# Patient Record
Sex: FEMALE | Race: WHITE | NOT HISPANIC OR LATINO | Employment: FULL TIME | URBAN - METROPOLITAN AREA
[De-identification: names, ages, dates, MRNs, and addresses within clinical notes are randomized per-mention and may not be internally consistent; named-entity substitution may affect disease eponyms.]

---

## 2024-02-12 DIAGNOSIS — R05.9 COUGH, UNSPECIFIED TYPE: Primary | ICD-10-CM

## 2024-02-15 ENCOUNTER — CLINICAL SUPPORT (OUTPATIENT)
Dept: PULMONOLOGY | Facility: CLINIC | Age: 29
End: 2024-02-15
Attending: NURSE PRACTITIONER
Payer: COMMERCIAL

## 2024-02-15 ENCOUNTER — OFFICE VISIT (OUTPATIENT)
Dept: PULMONOLOGY | Facility: CLINIC | Age: 29
End: 2024-02-15
Payer: COMMERCIAL

## 2024-02-15 ENCOUNTER — TELEPHONE (OUTPATIENT)
Dept: PULMONOLOGY | Facility: CLINIC | Age: 29
End: 2024-02-15
Payer: COMMERCIAL

## 2024-02-15 ENCOUNTER — HOSPITAL ENCOUNTER (OUTPATIENT)
Dept: RADIOLOGY | Facility: HOSPITAL | Age: 29
Discharge: HOME | End: 2024-02-15
Attending: NURSE PRACTITIONER
Payer: COMMERCIAL

## 2024-02-15 VITALS
OXYGEN SATURATION: 98 % | DIASTOLIC BLOOD PRESSURE: 80 MMHG | HEART RATE: 60 BPM | SYSTOLIC BLOOD PRESSURE: 124 MMHG | HEIGHT: 65 IN | BODY MASS INDEX: 36.4 KG/M2 | RESPIRATION RATE: 17 BRPM | WEIGHT: 218.5 LBS

## 2024-02-15 DIAGNOSIS — R05.9 COUGH, UNSPECIFIED TYPE: ICD-10-CM

## 2024-02-15 DIAGNOSIS — R05.9 COUGH, UNSPECIFIED TYPE: Primary | ICD-10-CM

## 2024-02-15 DIAGNOSIS — F17.200 SMOKES: ICD-10-CM

## 2024-02-15 LAB
BRPFT: ABNORMAL
DLCO SINGLE BREATH LLN: 22.02
DLCO SINGLE BREATH PRE REF: 89.8 %
DLCO SINGLE BREATH REF: 27.76
DLCOC SBVA LLN: 3.9
DLCOC SBVA REF: 5.44
DLCOC SINGLE BREATH LLN: 22.02
DLCOC SINGLE BREATH REF: 27.76
DLCOVA LLN: 3.9
DLCOVA PRE REF: 88.2 %
DLCOVA PRE: 4.79 ML/(MIN*MMHG*L) (ref 3.9–6.97)
DLCOVA REF: 5.44
ERV LLN: -16448.74
ERV PRE REF: 143.7 %
ERV REF: 1.26
FEF 25 75 CHG: 4.4 %
FEF 25 75 LLN: 2.33
FEF 25 75 POST REF: 64.8 %
FEF 25 75 PRE REF: 62 %
FEF 25 75 REF: 3.62
FET100 CHG: -5.2 %
FEV1 CHG: 2.6 %
FEV1 FVC CHG: -0.3 %
FEV1 FVC LLN: 73
FEV1 FVC POST REF: 88.3 %
FEV1 FVC PRE REF: 88.6 %
FEV1 FVC REF: 85
FEV1 LLN: 2.65
FEV1 POST REF: 89.9 %
FEV1 PRE REF: 87.6 %
FEV1 REF: 3.31
FRCPLETH LLN: 1.91
FRCPLETH PREREF: 136.6 %
FRCPLETH REF: 2.73
FVC CHG: 3 %
FVC LLN: 3.14
FVC POST REF: 101.3 %
FVC PRE REF: 98.3 %
FVC REF: 3.93
IVC PRE: 4.1 L (ref 3.14–4.75)
IVC SINGLE BREATH LLN: 3.14
IVC SINGLE BREATH PRE REF: 104.4 %
IVC SINGLE BREATH REF: 3.93
MVV LLN: 108
MVV PRE REF: 63.8 %
MVV REF: 127
PEF CHG: -14.7 %
PEF LLN: 5.39
PEF POST REF: 84.6 %
PEF PRE REF: 99.2 %
PEF REF: 7.15
POST FEF 25 75: 2.35 L/S (ref 2.33–5.13)
POST FET 100: 6.69 SEC
POST FEV1 FVC: 74.68 % (ref 73.12–93.68)
POST FEV1: 2.97 L (ref 2.65–3.94)
POST FVC: 3.98 L (ref 3.14–4.75)
POST PEF: 6.05 L/S (ref 5.39–8.92)
PRE DLCO: 24.92 ML/(MIN*MMHG) (ref 22.02–33.49)
PRE ERV: 1.81 L (ref -16448.74–16451.26)
PRE FEF 25 75: 2.25 L/S (ref 2.33–5.13)
PRE FET 100: 7.05 SEC
PRE FEV1 FVC: 74.94 % (ref 73.12–93.68)
PRE FEV1: 2.9 L (ref 2.65–3.94)
PRE FRC PL: 3.73 L (ref 1.91–3.55)
PRE FVC: 3.86 L (ref 3.14–4.75)
PRE MVV: 80.9 L/MIN (ref 107.83–145.89)
PRE PEF: 7.09 L/S (ref 5.39–8.92)
PRE RV: 1.92 L (ref 0.89–2.04)
PRE TLC: 5.82 L (ref 4.12–6.09)
RAW LLN: 3.06
RAW PRE REF: 122.4 %
RAW PRE: 3.75 CMH2O*S/L (ref 3.06–3.06)
RAW REF: 3.06
RV LLN: 0.89
RV PRE REF: 130.5 %
RV REF: 1.47
RVTLC LLN: 20
RVTLC PRE REF: 112.9 %
RVTLC PRE: 32.92 % (ref 19.57–38.75)
RVTLC REF: 29
TLC LLN: 4.12
TLC PRE REF: 114 %
TLC REF: 5.11
VA PRE: 5.2 L (ref 4.96–4.96)
VA SINGLE BREATH LLN: 4.96
VA SINGLE BREATH PRE REF: 104.9 %
VA SINGLE BREATH REF: 4.96
VC LLN: 3.14
VC PRE REF: 99.4 %
VC PRE: 3.9 L (ref 3.14–4.75)
VC REF: 3.93

## 2024-02-15 PROCEDURE — 1160F RVW MEDS BY RX/DR IN RCRD: CPT | Mod: CPTII,S$GLB,, | Performed by: NURSE PRACTITIONER

## 2024-02-15 PROCEDURE — 94729 DIFFUSING CAPACITY: CPT | Mod: S$GLB,,, | Performed by: INTERNAL MEDICINE

## 2024-02-15 PROCEDURE — 1159F MED LIST DOCD IN RCRD: CPT | Mod: CPTII,S$GLB,, | Performed by: NURSE PRACTITIONER

## 2024-02-15 PROCEDURE — 94060 EVALUATION OF WHEEZING: CPT | Mod: S$GLB,,, | Performed by: INTERNAL MEDICINE

## 2024-02-15 PROCEDURE — 99999 PR PBB SHADOW E&M-EST. PATIENT-LVL III: CPT | Mod: PBBFAC,,, | Performed by: NURSE PRACTITIONER

## 2024-02-15 PROCEDURE — 94726 PLETHYSMOGRAPHY LUNG VOLUMES: CPT | Mod: S$GLB,,, | Performed by: INTERNAL MEDICINE

## 2024-02-15 PROCEDURE — 3079F DIAST BP 80-89 MM HG: CPT | Mod: CPTII,S$GLB,, | Performed by: NURSE PRACTITIONER

## 2024-02-15 PROCEDURE — 71046 X-RAY EXAM CHEST 2 VIEWS: CPT | Mod: TC,FY,PO

## 2024-02-15 PROCEDURE — 3074F SYST BP LT 130 MM HG: CPT | Mod: CPTII,S$GLB,, | Performed by: NURSE PRACTITIONER

## 2024-02-15 PROCEDURE — 99203 OFFICE O/P NEW LOW 30 MIN: CPT | Mod: 25,S$GLB,, | Performed by: NURSE PRACTITIONER

## 2024-02-15 PROCEDURE — 3008F BODY MASS INDEX DOCD: CPT | Mod: CPTII,S$GLB,, | Performed by: NURSE PRACTITIONER

## 2024-02-15 PROCEDURE — 71046 X-RAY EXAM CHEST 2 VIEWS: CPT | Mod: 26,,, | Performed by: RADIOLOGY

## 2024-02-15 PROCEDURE — 99999 PR PBB SHADOW E&M-EST. PATIENT-LVL I: CPT | Mod: PBBFAC,,,

## 2024-02-15 RX ORDER — VENLAFAXINE HYDROCHLORIDE 75 MG/1
75 CAPSULE, EXTENDED RELEASE ORAL
COMMUNITY
Start: 2024-01-26 | End: 2024-03-06 | Stop reason: DRUGHIGH

## 2024-02-15 RX ORDER — ALBUTEROL SULFATE 90 UG/1
2 AEROSOL, METERED RESPIRATORY (INHALATION) EVERY 4 HOURS PRN
Qty: 8.7 G | Refills: 1 | Status: SHIPPED | OUTPATIENT
Start: 2024-02-15

## 2024-02-15 RX ORDER — CETIRIZINE HYDROCHLORIDE 5 MG/1
5 TABLET, CHEWABLE ORAL DAILY
COMMUNITY

## 2024-02-15 NOTE — PROGRESS NOTES
"Subjective:      Patient ID: Chuy Garner is a 28 y.o. female.    Chief Complaint: Cough (Rev cxr/  pft)    HPI    Patient presents to the office today for evaluation of chronic cough.  She has wheezing especially at night when she lies down.  She denies reflux or postnasal drip.  She is taking Primatene mist over-the-counter for her symptoms.  She smokes THC.    There is no problem list on file for this patient.    /80   Pulse 60   Resp 17   Ht 5' 5" (1.651 m)   Wt 99.1 kg (218 lb 7.6 oz)   SpO2 98%   BMI 36.36 kg/m²   Body mass index is 36.36 kg/m².    Review of Systems   Constitutional: Negative.    HENT: Negative.     Respiratory:  Positive for cough and wheezing.    Cardiovascular: Negative.    Musculoskeletal: Negative.    Gastrointestinal: Negative.    Neurological: Negative.    Psychiatric/Behavioral: Negative.       Objective:      Physical Exam  Constitutional:       Appearance: Normal appearance. She is well-developed.   HENT:      Head: Normocephalic and atraumatic.      Nose: Nose normal.      Mouth/Throat:      Pharynx: Oropharynx is clear.   Cardiovascular:      Rate and Rhythm: Normal rate and regular rhythm.      Heart sounds: No murmur heard.     No gallop.   Pulmonary:      Effort: Pulmonary effort is normal.      Breath sounds: Normal breath sounds.   Abdominal:      Palpations: Abdomen is soft. There is no mass.      Tenderness: There is no abdominal tenderness.   Musculoskeletal:         General: Normal range of motion.      Cervical back: Normal range of motion and neck supple.   Skin:     General: Skin is warm and dry.   Neurological:      Mental Status: She is alert and oriented to person, place, and time.   Psychiatric:         Mood and Affect: Mood normal.         Behavior: Behavior normal.       Personal Diagnostic Review    Results for orders placed during the hospital encounter of 02/15/24    X-Ray Chest PA And Lateral    Narrative  EXAM:  XR CHEST PA AND " LATERAL    CLINICAL HISTORY:  Cough    FINDINGS:    2 views.    The heart is normal in size.    The lungs are clear.    Impression  Normal chest    Finalized on: 2/15/2024 10:11 AM By:  Caleb Redding MD  [facility]# [id number]      2024-02-15 10:14:[id number]    [facility]    PFT: reviewed. normal    Assessment:       1. Cough, unspecified type    2. Smokes        Outpatient Encounter Medications as of 2/15/2024   Medication Sig Dispense Refill    venlafaxine (EFFEXOR-XR) 75 MG 24 hr capsule Take 75 mg by mouth.      albuterol (PROVENTIL/VENTOLIN HFA) 90 mcg/actuation inhaler Inhale 2 puffs into the lungs every 4 (four) hours as needed for Wheezing or Shortness of Breath. 8.7 g 1    cetirizine (ZYRTEC) 5 MG chewable tablet Take 5 mg by mouth once daily.       No facility-administered encounter medications on file as of 2/15/2024.     No orders of the defined types were placed in this encounter.    Plan:   Discussed discontinuing smoking-pulmonary irritants.  Take albuterol as needed.  Discontinue Primatene mist.   Normal PFT and chest x-ray.  Follow-up as needed.      Problem List Items Addressed This Visit    None  Visit Diagnoses       Cough, unspecified type    -  Primary    Relevant Medications    albuterol (PROVENTIL/VENTOLIN HFA) 90 mcg/actuation inhaler    Smokes                             Elizabeth LeJeune, KELECHIP, ANP

## 2024-03-06 ENCOUNTER — OFFICE VISIT (OUTPATIENT)
Dept: PRIMARY CARE CLINIC | Facility: CLINIC | Age: 29
End: 2024-03-06
Payer: COMMERCIAL

## 2024-03-06 DIAGNOSIS — Z11.4 ENCOUNTER FOR SCREENING FOR HIV: ICD-10-CM

## 2024-03-06 DIAGNOSIS — F41.9 ANXIETY AND DEPRESSION: ICD-10-CM

## 2024-03-06 DIAGNOSIS — Z11.59 ENCOUNTER FOR HCV SCREENING TEST FOR LOW RISK PATIENT: ICD-10-CM

## 2024-03-06 DIAGNOSIS — Z13.220 ENCOUNTER FOR LIPID SCREENING FOR CARDIOVASCULAR DISEASE: ICD-10-CM

## 2024-03-06 DIAGNOSIS — Z13.6 ENCOUNTER FOR LIPID SCREENING FOR CARDIOVASCULAR DISEASE: ICD-10-CM

## 2024-03-06 DIAGNOSIS — Z76.89 ENCOUNTER TO ESTABLISH CARE: Primary | ICD-10-CM

## 2024-03-06 DIAGNOSIS — F32.A ANXIETY AND DEPRESSION: ICD-10-CM

## 2024-03-06 DIAGNOSIS — Z13.1 ENCOUNTER FOR SCREENING FOR DIABETES MELLITUS: ICD-10-CM

## 2024-03-06 PROCEDURE — 99214 OFFICE O/P EST MOD 30 MIN: CPT | Mod: 95,,, | Performed by: FAMILY MEDICINE

## 2024-03-06 PROCEDURE — G2211 COMPLEX E/M VISIT ADD ON: HCPCS | Mod: 95,,, | Performed by: FAMILY MEDICINE

## 2024-03-06 RX ORDER — VENLAFAXINE HYDROCHLORIDE 37.5 MG/1
37.5 CAPSULE, EXTENDED RELEASE ORAL DAILY
Qty: 30 CAPSULE | Refills: 11 | Status: SHIPPED | OUTPATIENT
Start: 2024-03-06 | End: 2024-05-01

## 2024-03-06 NOTE — PATIENT INSTRUCTIONS
On screen, everything sounds good today.      I am okay with you trying to wean off of the Effexor.  I am sending a new prescription for the 37.5 mg dose to the pharmacy.      Starting tomorrow, alternate the 37.5 mg dose with the 75 mg dose every other day for one-two weeks.  Then, just take the 37.5 mg dose daily for another one-two weeks.  Then, take the 37.5 mg dose every other day for an additional week or two (skip a day between doses).  Lastly, take the 37.5 mg dose every 3rd day for a final week (skip two days between doses).  At that point, you can stop taking it completely.    As you wean down, if you find yourself feeling bad, or if the anxiety/depressive symptoms start to return, go back to the last dose you are comfortable at and talk with me.      Let us try to get together in about 6-8 weeks to see how you are doing and for wellness exam.  I am going to place some orders for some screening blood work.  Please try to do that a couple of days before our visit so we can discuss the results together.    At that time, if you would like to discuss medications to help with weight loss, we can certainly do that, as well.    Continue to eat a healthy diet.  Be careful with portion sizes.  Includes lots of fresh fruits, vegetables, whole grains, lean proteins.  See info below.    Keep hydrated.  Be sure to drink at least 8-10, 8 oz, glasses of water every day.    Stay active.  Try to do some sort of physical activity every day.  Nothing outrageous, just try walking for 10-15 minutes each day.

## 2024-03-06 NOTE — PROGRESS NOTES
"    [facility]       [address] [place] [address]      Phone: [phone]      Fax: [phone]    Francisco Cristina MD                Video Visit  03/06/2024        Subjective      HPI:  Chuy Garner is a 29 y.o. female presents today via video for "No chief complaint on file.  ."     29-year-old female presents today to establish care, discuss anxiety/depression medication.      Patient states she just moved back to [place] from [place] in September.  Has not been able to get established with anyone yet for primary care.      Has a history of anxiety/depression.  Has been taking Effexor for years.  Currently takes 75 mg daily.  In the past, she saw a counselor for about 2-3 years.  Really helped her a lot.  Praesel a lot of good coping mechanisms.  Found that living in [place] was a bit stressful, they were not happy.  So, she and her girlfriend decided to move back here.  Things have been much happier.  Being home helps.  She feels well enough that she would like to start weaning off of the medication.    She has some occasional allergy symptoms.  Uses OTC antihistamine for this.      Otherwise, she feels good.  No chest pain, shortness on breath.  No fever, chills, body aches.  No coughing, sneezing, URI type symptoms.  Appetite normal.  Bowel movements normal.  No urinary issues.      PMH: Anxiety/depression.  Allergies.    PSH: Cyst on buttock.    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  T: Denies   A: Occasionally/socially   D:  Denies    Exercise: Just starting to exercise again.  Wants to lose weight.        The following were updated and reviewed by myself in the chart: medications, past medical history, past surgical history, family history, social history, and allergies.     Medications:  Current Outpatient Medications on File Prior to Visit   Medication Sig Dispense Refill " "   albuterol (PROVENTIL/VENTOLIN HFA) 90 mcg/actuation inhaler Inhale 2 puffs into the lungs every 4 (four) hours as needed for Wheezing or Shortness of Breath. 8.7 g 1    cetirizine (ZYRTEC) 5 MG chewable tablet Take 5 mg by mouth once daily.      [DISCONTINUED] venlafaxine (EFFEXOR-XR) 75 MG 24 hr capsule Take 75 mg by mouth.       No current facility-administered medications on file prior to visit.        PMHx:  No past medical history on file.   There are no problems to display for this patient.       PSHx:  Past Surgical History:   Procedure Laterality Date    CYST REMOVAL  2010        FHx:  No family history on file.     Social:  Social History     Socioeconomic History    Marital status: Single   Tobacco Use    Smoking status: Never     Passive exposure: Never    Smokeless tobacco: Never   Substance and Sexual Activity    Alcohol use: Yes    Drug use: Never    Sexual activity: Yes        Allergies:  Review of patient's allergies indicates:  No Known Allergies     ROS:  Review of Systems   Constitutional:  Positive for unexpected weight change. Negative for activity change.   HENT:  Negative for hearing loss, rhinorrhea and trouble swallowing.    Eyes:  Negative for discharge and visual disturbance.   Respiratory:  Negative for chest tightness and wheezing.    Cardiovascular:  Negative for chest pain and palpitations.   Gastrointestinal:  Negative for blood in stool, constipation, diarrhea and vomiting.   Endocrine: Negative for polydipsia and polyuria.   Genitourinary:  Negative for difficulty urinating, dysuria, hematuria and menstrual problem.   Musculoskeletal:  Negative for arthralgias, joint swelling and neck pain.   Neurological:  Negative for weakness and headaches.   Psychiatric/Behavioral:  Negative for confusion and dysphoric mood.           Objective      There were no vitals taken for this visit.  Ht Readings from Last 3 Encounters:   02/15/24 5' 5" (1.651 m)     Wt Readings from Last 3 " Encounters:   02/15/24 99.1 kg (218 lb 7.6 oz)       PHYSICAL EXAM:  Physical Exam  Constitutional:       General: She is not in acute distress.     Appearance: Normal appearance. She is not ill-appearing.   HENT:      Head: Normocephalic and atraumatic.   Pulmonary:      Effort: Pulmonary effort is normal. No respiratory distress.   Neurological:      Mental Status: She is alert.   Psychiatric:         Mood and Affect: Mood normal.         Behavior: Behavior normal.         Thought Content: Thought content normal.              LABS / IMAGING:  Recent Results (from the past 4368 hour(s))   Complete PFT with bronchodilator    Collection Time: 02/15/24 10:45 AM   Result Value Ref Range    Interpretation       Spirometry shows a low FEF 25-75 suggesting mild obstruction may be present. Spirometry remains unimproved following bronchodilator. Airway mechanics are abnormal showing increased airway resistance and decreased conductance. DLCO is normal. MVV is   moderately decreased.   Â   Notes:  Â   The failure to demonstrate improvement in spirometry does not preclude a clinical response to a trial of bronchodilators. No recent hemoglobin value available. DLCO interpretation assumes a normal hemoglobin value. MVV is reduced out of proportion to   FEV1 suggesting poor effort or difficulty performing the maneuver or neuromuscular disease.       Post FVC 3.98 3.14 - 4.75 L    Post FEV1 2.97 2.65 - 3.94 L    Post FEV1 FVC 74.68 73.12 - 93.68 %    Post FEF 25 75 2.35 2.33 - 5.13 L/s    Post PEF 6.05 5.39 - 8.92 L/s    Post  6.69 sec    Pre DLCO 24.92 22.02 - 33.49 ml/(min*mmHg)    DLCOVA PRE 4.79 3.90 - 6.97 ml/(min*mmHg*L)    VA PRE 5.20 (H) 4.96 - 4.96 L    IVC PRE 4.10 3.14 - 4.75 L    Pre TLC 5.82 4.12 - 6.09 L    VC PRE 3.90 3.14 - 4.75 L    Pre FRC PL 3.73 (H) 1.91 - 3.55 L    Pre ERV 1.81 -11580.74 - 34043.26 L    Pre RV 1.92 0.89 - 2.04 L    RVTLC PRE 32.92 19.57 - 38.75 %    Raw PRE 3.75 (H) 3.06 - 3.06  cmH2O*s/L    Pre FVC 3.86 3.14 - 4.75 L    Pre FEV1 2.90 2.65 - 3.94 L    Pre FEV1 FVC 74.94 73.12 - 93.68 %    Pre FEF 25 75 2.25 (L) 2.33 - 5.13 L/s    Pre PEF 7.09 5.39 - 8.92 L/s    Pre  7.05 sec    Pre MVV 80.90 (L) 107.83 - 145.89 L/min    FVC Ref 3.93     FVC LLN 3.14     FVC Pre Ref 98.3 %    FVC Post Ref 101.3 %    FVC Chg 3.0 %    FEV1 Ref 3.31     FEV1 LLN 2.65     FEV1 Pre Ref 87.6 %    FEV1 Post Ref 89.9 %    FEV1 Chg 2.6 %    FEV1 FVC Ref 85     FEV1 FVC LLN 73     FEV1 FVC Pre Ref 88.6 %    FEV1 FVC Post Ref 88.3 %    FEV1 FVC Chg -0.3 %    FEF 25 75 Ref 3.62     FEF 25 75 LLN 2.33     FEF 25 75 Pre Ref 62.0 %    FEF 25 75 Post Ref 64.8 %    FEF 25 75 Chg 4.4 %    PEF Ref 7.15     PEF LLN 5.39     PEF Pre Ref 99.2 %    PEF Post Ref 84.6 %    PEF Chg -14.7 %    INX507 Chg -5.2 %    MVV Ref 127     MVV      MVV Pre Ref 63.8 %    TLC Ref 5.11     TLC LLN 4.12     TLC Pre Ref 114.0 %    VC Ref 3.93     VC LLN 3.14     VC Pre Ref 99.4 %    FRCpleth Ref 2.73     FRCpleth LLN 1.91     FRCpleth PreRef 136.6 %    ERV Ref 1.26     ERV LLN -95755.74     ERV Pre Ref 143.7 %    RV Ref 1.47     RV LLN 0.89     RV Pre Ref 130.5 %    RVTLC Ref 29     RVTLC LLN 20     RVTLC Pre Ref 112.9 %    Raw Ref 3.06     Raw LLN 3.06     Raw Pre Ref 122.4 %    DLCO Single Breath Ref 27.76     DLCO Single Breath LLN 22.02     DLCO Single Breath Pre Ref 89.8 %    DLCOc Single Breath Ref 27.76     DLCOc Single Breath LLN 22.02     DLCOVA Ref 5.44     DLCOVA LLN 3.90     DLCOVA Pre Ref 88.2 %    DLCOc SBVA Ref 5.44     DLCOc SBVA LLN 3.90     VA Single Breath Ref 4.96     VA Single Breath LLN 4.96     VA Single Breath Pre Ref 104.9 %    IVC Single Breath Ref 3.93     IVC Single Breath LLN 3.14     IVC Single Breath Pre Ref 104.4 %         Assessment    1. Encounter to establish care    2. Anxiety and depression    3. Encounter for lipid screening for cardiovascular disease    4. Encounter for screening for diabetes  mellitus    5. Encounter for screening for HIV    6. Encounter for HCV screening test for low risk patient          Plan    Diagnoses and all orders for this visit:    Encounter to establish care    Anxiety and depression  -     CBC Auto Differential; Future  -     Comprehensive Metabolic Panel; Future  -     TSH; Future  -     Lipid Panel; Future  -     Hemoglobin A1C; Future  -     venlafaxine (EFFEXOR-XR) 37.5 MG 24 hr capsule; Take 1 capsule (37.5 mg total) by mouth once daily.    Encounter for lipid screening for cardiovascular disease  -     Lipid Panel; Future    Encounter for screening for diabetes mellitus  -     Hemoglobin A1C; Future    Encounter for screening for HIV  -     HIV 1/2 Ag/Ab (4th Gen); Future    Encounter for HCV screening test for low risk patient  -     Hepatitis C Antibody; Future    Physically, everything looks good on video.      Okay to wean off of Effexor.  Currently on 75 mg daily.  Will have her alternate 75 mg with 37.5 mg for 1-2 weeks.  Then, 37.5 mg daily for 1-2 weeks.  Then, 37.5 mg every other day for 1-2 weeks.  Then 37.5 mg every 3rd day for one week.  Then can discontinue.      Orders placed for screening blood work to be done prior to next visit (annual wellness).      FOLLOW-UP:  Follow up in about 8 weeks (around 5/1/2024) for check up, annual exam.    The patient location is:  [place]  The chief complaint leading to consultation is:  Anxiety/depression medication    Visit type: audiovisual    Face to Face time with patient: 20 minutes    35 minutes of total time spent on the encounter, which includes face to face time and non-face to face time preparing to see the patient (eg, review of tests), Obtaining and/or reviewing separately obtained history, Documenting clinical information in the electronic or other health record, Independently interpreting results (not separately reported) and communicating results to the patient/family/caregiver, or Care coordination (not  separately reported). Visit today included increased complexity associated with the care of the episodic problem addressed and managing the longitudinal care of the patient due to the serious and/or complex managed problem(s).    Each patient to whom he or she provides medical services by telemedicine is:  (1) informed of the relationship between the physician and patient and the respective role of any other health care provider with respect to management of the patient; and (2) notified that he or she may decline to receive medical services by telemedicine and may withdraw from such care at any time.    Signed by:  Francisco Cristina MD

## 2024-05-01 ENCOUNTER — OFFICE VISIT (OUTPATIENT)
Dept: PRIMARY CARE CLINIC | Facility: CLINIC | Age: 29
End: 2024-05-01
Payer: COMMERCIAL

## 2024-05-01 DIAGNOSIS — F32.A ANXIETY AND DEPRESSION: ICD-10-CM

## 2024-05-01 DIAGNOSIS — F41.9 ANXIETY AND DEPRESSION: ICD-10-CM

## 2024-05-01 DIAGNOSIS — Z00.00 ANNUAL PHYSICAL EXAM: Primary | ICD-10-CM

## 2024-05-01 PROCEDURE — 99395 PREV VISIT EST AGE 18-39: CPT | Mod: 95,,, | Performed by: FAMILY MEDICINE

## 2024-05-01 NOTE — PROGRESS NOTES
"    [facility]       [address] [place] [address]      Phone: [phone]      Fax: [phone]    Francisco Cristina MD                Video Visit  05/01/2024        Subjective      HPI:  Chuy Garner is a 29 y.o. female presents today via video for "No chief complaint on file.  ."     29-year-old female presents today via video visit for checkup, wellness exam.      Patient states that she feels fine today.  No acute complaints.  No chest pain, shortness on breath.  No fever, chills, body aches.  No coughing, sneezing, URI type symptoms.  Appetite normal.  Bowel movements normal.  No urinary issues.    Has anxiety/depression.  Was previously taking Effexor for several years.  Several months ago, she moved back home to [place] from [place].  Things were going well, so she decided that she was ready to wean off of the medication.  We tapered it down very slowly over several weeks.  She is currently been off of it for about four weeks now.  Finds that she is struggling to manage her emotions from time to time.  Work gets stressful.  Sometimes she gets angry/rage.  Sometimes she will spend the week crying.  This week, she is okay.  Her girlfriend also notices the fluctuating emotions.  She has not sure if she wants to get back on the medication, or not.  Wants to give it a few more weeks to make sure it is all out of her system.    In the past, she did some counseling.  Got some benefit from this.  Open to the possibility of restarting.    She has some occasional allergy symptoms.  Uses OTC antihistamine for this.      PMH: Anxiety/depression.  Allergies.    PSH: Cyst on buttock.    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  T: Denies   A: Occasionally/socially   D:  Denies    Exercise: Just starting to exercise again.  Wants to lose weight.        The following were updated and reviewed by " myself in the chart: medications, past medical history, past surgical history, family history, social history, and allergies.     Medications:  Current Outpatient Medications on File Prior to Visit   Medication Sig Dispense Refill    albuterol (PROVENTIL/VENTOLIN HFA) 90 mcg/actuation inhaler Inhale 2 puffs into the lungs every 4 (four) hours as needed for Wheezing or Shortness of Breath. 8.7 g 1    cetirizine (ZYRTEC) 5 MG chewable tablet Take 5 mg by mouth once daily.      [DISCONTINUED] venlafaxine (EFFEXOR-XR) 37.5 MG 24 hr capsule Take 1 capsule (37.5 mg total) by mouth once daily. 30 capsule 11     No current facility-administered medications on file prior to visit.        PMHx:  No past medical history on file.   There are no problems to display for this patient.       PSHx:  Past Surgical History:   Procedure Laterality Date    CYST REMOVAL  2010        FHx:  No family history on file.     Social:  Social History     Socioeconomic History    Marital status: Single   Tobacco Use    Smoking status: Never     Passive exposure: Never    Smokeless tobacco: Never   Substance and Sexual Activity    Alcohol use: Yes    Drug use: Never    Sexual activity: Yes        Allergies:  Review of patient's allergies indicates:  No Known Allergies     ROS:  Review of Systems   Constitutional:  Negative for activity change, appetite change, chills, fever and unexpected weight change.   HENT:  Negative for congestion, hearing loss, postnasal drip, rhinorrhea, sore throat and trouble swallowing.    Eyes:  Negative for discharge and visual disturbance.   Respiratory:  Negative for cough, chest tightness, shortness of breath and wheezing.    Cardiovascular:  Negative for chest pain and palpitations.   Gastrointestinal:  Negative for abdominal pain, blood in stool, constipation, diarrhea, nausea and vomiting.   Endocrine: Negative for polydipsia and polyuria.   Genitourinary:  Negative for difficulty urinating, dysuria, hematuria  "and menstrual problem.   Musculoskeletal:  Negative for arthralgias, joint swelling, myalgias and neck pain.   Skin:  Negative for color change and rash.   Neurological:  Negative for speech difficulty, weakness and headaches.   Psychiatric/Behavioral:  Positive for dysphoric mood. Negative for confusion.    All other systems reviewed and are negative.         Objective      There were no vitals taken for this visit.  Ht Readings from Last 3 Encounters:   02/15/24 5' 5" (1.651 m)     Wt Readings from Last 3 Encounters:   02/15/24 99.1 kg (218 lb 7.6 oz)       PHYSICAL EXAM:  Physical Exam  Constitutional:       General: She is not in acute distress.     Appearance: Normal appearance. She is not ill-appearing.   HENT:      Head: Normocephalic and atraumatic.   Pulmonary:      Effort: Pulmonary effort is normal. No respiratory distress.   Neurological:      Mental Status: She is alert.   Psychiatric:         Mood and Affect: Mood normal.         Behavior: Behavior normal.         Thought Content: Thought content normal.              LABS / IMAGING:  Recent Results (from the past 4368 hour(s))   Complete PFT with bronchodilator    Collection Time: 02/15/24 10:45 AM   Result Value Ref Range    Interpretation       Spirometry shows a low FEF 25-75 suggesting mild obstruction may be present. Spirometry remains unimproved following bronchodilator. Airway mechanics are abnormal showing increased airway resistance and decreased conductance. DLCO is normal. MVV is   moderately decreased.   Â   Notes:  Â   The failure to demonstrate improvement in spirometry does not preclude a clinical response to a trial of bronchodilators. No recent hemoglobin value available. DLCO interpretation assumes a normal hemoglobin value. MVV is reduced out of proportion to   FEV1 suggesting poor effort or difficulty performing the maneuver or neuromuscular disease.       Post FVC 3.98 3.14 - 4.75 L    Post FEV1 2.97 2.65 - 3.94 L    Post FEV1 FVC " 74.68 73.12 - 93.68 %    Post FEF 25 75 2.35 2.33 - 5.13 L/s    Post PEF 6.05 5.39 - 8.92 L/s    Post  6.69 sec    Pre DLCO 24.92 22.02 - 33.49 ml/(min*mmHg)    DLCOVA PRE 4.79 3.90 - 6.97 ml/(min*mmHg*L)    VA PRE 5.20 (H) 4.96 - 4.96 L    IVC PRE 4.10 3.14 - 4.75 L    Pre TLC 5.82 4.12 - 6.09 L    VC PRE 3.90 3.14 - 4.75 L    Pre FRC PL 3.73 (H) 1.91 - 3.55 L    Pre ERV 1.81 -83461.74 - 98513.26 L    Pre RV 1.92 0.89 - 2.04 L    RVTLC PRE 32.92 19.57 - 38.75 %    Raw PRE 3.75 (H) 3.06 - 3.06 cmH2O*s/L    Pre FVC 3.86 3.14 - 4.75 L    Pre FEV1 2.90 2.65 - 3.94 L    Pre FEV1 FVC 74.94 73.12 - 93.68 %    Pre FEF 25 75 2.25 (L) 2.33 - 5.13 L/s    Pre PEF 7.09 5.39 - 8.92 L/s    Pre  7.05 sec    Pre MVV 80.90 (L) 107.83 - 145.89 L/min    FVC Ref 3.93     FVC LLN 3.14     FVC Pre Ref 98.3 %    FVC Post Ref 101.3 %    FVC Chg 3.0 %    FEV1 Ref 3.31     FEV1 LLN 2.65     FEV1 Pre Ref 87.6 %    FEV1 Post Ref 89.9 %    FEV1 Chg 2.6 %    FEV1 FVC Ref 85     FEV1 FVC LLN 73     FEV1 FVC Pre Ref 88.6 %    FEV1 FVC Post Ref 88.3 %    FEV1 FVC Chg -0.3 %    FEF 25 75 Ref 3.62     FEF 25 75 LLN 2.33     FEF 25 75 Pre Ref 62.0 %    FEF 25 75 Post Ref 64.8 %    FEF 25 75 Chg 4.4 %    PEF Ref 7.15     PEF LLN 5.39     PEF Pre Ref 99.2 %    PEF Post Ref 84.6 %    PEF Chg -14.7 %    RYD335 Chg -5.2 %    MVV Ref 127     MVV      MVV Pre Ref 63.8 %    TLC Ref 5.11     TLC LLN 4.12     TLC Pre Ref 114.0 %    VC Ref 3.93     VC LLN 3.14     VC Pre Ref 99.4 %    FRCpleth Ref 2.73     FRCpleth LLN 1.91     FRCpleth PreRef 136.6 %    ERV Ref 1.26     ERV LLN -52521.74     ERV Pre Ref 143.7 %    RV Ref 1.47     RV LLN 0.89     RV Pre Ref 130.5 %    RVTLC Ref 29     RVTLC LLN 20     RVTLC Pre Ref 112.9 %    Raw Ref 3.06     Raw LLN 3.06     Raw Pre Ref 122.4 %    DLCO Single Breath Ref 27.76     DLCO Single Breath LLN 22.02     DLCO Single Breath Pre Ref 89.8 %    DLCOc Single Breath Ref 27.76     DLCOc Single Breath LLN  22.02     DLCOVA Ref 5.44     DLCOVA LLN 3.90     DLCOVA Pre Ref 88.2 %    DLCOc SBVA Ref 5.44     DLCOc SBVA LLN 3.90     VA Single Breath Ref 4.96     VA Single Breath LLN 4.96     VA Single Breath Pre Ref 104.9 %    IVC Single Breath Ref 3.93     IVC Single Breath LLN 3.14     IVC Single Breath Pre Ref 104.4 %         Assessment    1. Annual physical exam    2. Anxiety and depression          Plan    Diagnoses and all orders for this visit:    Annual physical exam    Anxiety and depression      On screen, everything looks pretty good today.      We will send her a list of counselors/therapists nearby with whom she can get established.    Okay to stay off Effexor, if she likes.  We can always restart at low dose if she gets to that point.  She will let us know.      Last visit, we placed orders for screening blood work.  She has not been able to get this done.  She will try make time in the next couple of weeks.    FOLLOW-UP:  Follow up if symptoms worsen or fail to improve, for and in 1 year for annual exam.    The patient location is:  [place]  The chief complaint leading to consultation is:  Wellness exam, anxiety/depression    Visit type: audiovisual    Face to Face time with patient: 20 minutes    35 minutes of total time spent on the encounter, which includes face to face time and non-face to face time preparing to see the patient (eg, review of tests), Obtaining and/or reviewing separately obtained history, Documenting clinical information in the electronic or other health record, Independently interpreting results (not separately reported) and communicating results to the patient/family/caregiver, or Care coordination (not separately reported). Visit today included increased complexity associated with the care of the episodic problem addressed and managing the longitudinal care of the patient due to the serious and/or complex managed problem(s).    Each patient to whom he or she provides medical services  by telemedicine is:  (1) informed of the relationship between the physician and patient and the respective role of any other health care provider with respect to management of the patient; and (2) notified that he or she may decline to receive medical services by telemedicine and may withdraw from such care at any time.    Signed by:  Francisco Cristina MD

## 2024-05-01 NOTE — PATIENT INSTRUCTIONS
On screen, everything looks pretty good.      Last visit, I placed some orders for screening blood work.  Feel free to pass by the lab whenever it is convenient to get the blood drawn.  Ideally, would like you to be fasting for this.  We will contact you with results as soon as they are available.      I am okay with staying off of the Effexor.  If/when you get to the point where you would like to restart it, just let me know.  We can always start with a low dose and give it lots of time.      Medicine is great, but counseling can work just as well.  Might not be a bad idea to restart.    I recommend Dayana Faye L[facility].  Feel free to call her at [phone].  Her office is located at [address] [place] [address], [address], here in [place].    [email or web address]    Alternatively, you can try contacting [facility] at [phone].  They have several counselors on staff that can help.  [email or web address]    If you would like to try using an [facility] therapist/counselor, please let me know.  I can place a referral to our Psychiatry Department.    Continue to eat a healthy diet.  Be careful with portion sizes.  Includes lots of fresh fruits, vegetables, whole grains, lean proteins.  See info below.    Keep hydrated.  Be sure to drink at least 8-10, 8 oz, glasses of water every day.    Stay active.  Try to do some sort of physical activity every day.  Nothing outrageous, just try walking for 10-15 minutes each day.

## 2025-07-25 ENCOUNTER — LAB VISIT (OUTPATIENT)
Dept: LAB | Facility: HOSPITAL | Age: 30
End: 2025-07-25
Attending: FAMILY MEDICINE
Payer: COMMERCIAL

## 2025-07-25 ENCOUNTER — OFFICE VISIT (OUTPATIENT)
Dept: PRIMARY CARE CLINIC | Facility: CLINIC | Age: 30
End: 2025-07-25
Payer: COMMERCIAL

## 2025-07-25 VITALS
HEIGHT: 65 IN | RESPIRATION RATE: 16 BRPM | WEIGHT: 202.81 LBS | HEART RATE: 70 BPM | BODY MASS INDEX: 33.79 KG/M2 | SYSTOLIC BLOOD PRESSURE: 112 MMHG | TEMPERATURE: 98 F | OXYGEN SATURATION: 97 % | DIASTOLIC BLOOD PRESSURE: 72 MMHG

## 2025-07-25 DIAGNOSIS — Z11.4 ENCOUNTER FOR SCREENING FOR HIV: ICD-10-CM

## 2025-07-25 DIAGNOSIS — Z13.6 ENCOUNTER FOR LIPID SCREENING FOR CARDIOVASCULAR DISEASE: ICD-10-CM

## 2025-07-25 DIAGNOSIS — Z12.4 CERVICAL CANCER SCREENING: ICD-10-CM

## 2025-07-25 DIAGNOSIS — Z13.220 ENCOUNTER FOR LIPID SCREENING FOR CARDIOVASCULAR DISEASE: ICD-10-CM

## 2025-07-25 DIAGNOSIS — Z13.1 ENCOUNTER FOR SCREENING FOR DIABETES MELLITUS: ICD-10-CM

## 2025-07-25 DIAGNOSIS — Z11.59 ENCOUNTER FOR HCV SCREENING TEST FOR LOW RISK PATIENT: ICD-10-CM

## 2025-07-25 DIAGNOSIS — Z00.00 ANNUAL PHYSICAL EXAM: Primary | ICD-10-CM

## 2025-07-25 DIAGNOSIS — F32.A ANXIETY AND DEPRESSION: ICD-10-CM

## 2025-07-25 DIAGNOSIS — Z00.00 ANNUAL PHYSICAL EXAM: ICD-10-CM

## 2025-07-25 DIAGNOSIS — F41.9 ANXIETY AND DEPRESSION: ICD-10-CM

## 2025-07-25 DIAGNOSIS — L73.9 FOLLICULITIS: ICD-10-CM

## 2025-07-25 LAB
ABSOLUTE EOSINOPHIL (OHS): 0.51 K/UL
ABSOLUTE MONOCYTE (OHS): 0.63 K/UL (ref 0.3–1)
ABSOLUTE NEUTROPHIL COUNT (OHS): 4.87 K/UL (ref 1.8–7.7)
ALBUMIN SERPL BCP-MCNC: 4.2 G/DL (ref 3.5–5.2)
ALP SERPL-CCNC: 73 UNIT/L (ref 40–150)
ALT SERPL W/O P-5'-P-CCNC: 11 UNIT/L (ref 0–55)
ANION GAP (OHS): 6 MMOL/L (ref 8–16)
AST SERPL-CCNC: 24 UNIT/L (ref 0–50)
BASOPHILS # BLD AUTO: 0.13 K/UL
BASOPHILS NFR BLD AUTO: 1.5 %
BILIRUB SERPL-MCNC: 0.6 MG/DL (ref 0.1–1)
BUN SERPL-MCNC: 14 MG/DL (ref 6–20)
CALCIUM SERPL-MCNC: 9 MG/DL (ref 8.7–10.5)
CHLORIDE SERPL-SCNC: 107 MMOL/L (ref 95–110)
CHOLEST SERPL-MCNC: 158 MG/DL (ref 120–199)
CHOLEST/HDLC SERPL: 2.9 {RATIO} (ref 2–5)
CO2 SERPL-SCNC: 25 MMOL/L (ref 23–29)
CREAT SERPL-MCNC: 0.8 MG/DL (ref 0.5–1.4)
EAG (OHS): 91 MG/DL (ref 68–131)
ERYTHROCYTE [DISTWIDTH] IN BLOOD BY AUTOMATED COUNT: 12 % (ref 11.5–14.5)
GFR SERPLBLD CREATININE-BSD FMLA CKD-EPI: >60 ML/MIN/1.73/M2
GLUCOSE SERPL-MCNC: 89 MG/DL (ref 70–110)
HBA1C MFR BLD: 4.8 % (ref 4–5.6)
HCT VFR BLD AUTO: 43 % (ref 37–48.5)
HCV AB SERPL QL IA: NORMAL
HDLC SERPL-MCNC: 55 MG/DL (ref 40–75)
HDLC SERPL: 34.8 % (ref 20–50)
HGB BLD-MCNC: 13.9 GM/DL (ref 12–16)
HIV 1+2 AB+HIV1 P24 AG SERPL QL IA: NORMAL
IMM GRANULOCYTES # BLD AUTO: 0.03 K/UL (ref 0–0.04)
IMM GRANULOCYTES NFR BLD AUTO: 0.4 % (ref 0–0.5)
LDLC SERPL CALC-MCNC: 91.6 MG/DL (ref 63–159)
LYMPHOCYTES # BLD AUTO: 2.32 K/UL (ref 1–4.8)
MCH RBC QN AUTO: 28.8 PG (ref 27–31)
MCHC RBC AUTO-ENTMCNC: 32.3 G/DL (ref 32–36)
MCV RBC AUTO: 89 FL (ref 82–98)
NONHDLC SERPL-MCNC: 103 MG/DL
NUCLEATED RBC (/100WBC) (OHS): 0 /100 WBC
PLATELET # BLD AUTO: 252 K/UL (ref 150–450)
PMV BLD AUTO: 11.6 FL (ref 9.2–12.9)
POTASSIUM SERPL-SCNC: 4.5 MMOL/L (ref 3.5–5.1)
PROT SERPL-MCNC: 7.1 GM/DL (ref 6–8.4)
RBC # BLD AUTO: 4.82 M/UL (ref 4–5.4)
RELATIVE EOSINOPHIL (OHS): 6 %
RELATIVE LYMPHOCYTE (OHS): 27.3 % (ref 18–48)
RELATIVE MONOCYTE (OHS): 7.4 % (ref 4–15)
RELATIVE NEUTROPHIL (OHS): 57.4 % (ref 38–73)
SODIUM SERPL-SCNC: 138 MMOL/L (ref 136–145)
TRIGL SERPL-MCNC: 57 MG/DL (ref 30–150)
TSH SERPL-ACNC: 1.84 UIU/ML (ref 0.4–4)
WBC # BLD AUTO: 8.49 K/UL (ref 3.9–12.7)

## 2025-07-25 PROCEDURE — 80061 LIPID PANEL: CPT

## 2025-07-25 PROCEDURE — 84443 ASSAY THYROID STIM HORMONE: CPT

## 2025-07-25 PROCEDURE — 36415 COLL VENOUS BLD VENIPUNCTURE: CPT | Mod: PN

## 2025-07-25 PROCEDURE — 83036 HEMOGLOBIN GLYCOSYLATED A1C: CPT

## 2025-07-25 PROCEDURE — 82040 ASSAY OF SERUM ALBUMIN: CPT

## 2025-07-25 PROCEDURE — 87389 HIV-1 AG W/HIV-1&-2 AB AG IA: CPT

## 2025-07-25 PROCEDURE — 86803 HEPATITIS C AB TEST: CPT

## 2025-07-25 PROCEDURE — 99999 PR PBB SHADOW E&M-EST. PATIENT-LVL V: CPT | Mod: PBBFAC,,, | Performed by: FAMILY MEDICINE

## 2025-07-25 PROCEDURE — 85025 COMPLETE CBC W/AUTO DIFF WBC: CPT

## 2025-07-25 RX ORDER — PROPRANOLOL HYDROCHLORIDE 20 MG/1
20 TABLET ORAL 3 TIMES DAILY PRN
Qty: 90 TABLET | Refills: 11 | Status: SHIPPED | OUTPATIENT
Start: 2025-07-25 | End: 2026-07-25

## 2025-07-25 RX ORDER — MUPIROCIN 20 MG/G
OINTMENT TOPICAL 2 TIMES DAILY
Qty: 30 G | Refills: 0 | Status: SHIPPED | OUTPATIENT
Start: 2025-07-25 | End: 2025-08-08

## 2025-07-25 NOTE — PATIENT INSTRUCTIONS
Physically, everything looks really good today.      Let us get some screening blood work done to check things on the inside.  Results will be posted to [facility] as soon as they are available.      For your anxiety, let us try using propranolol.  This is an old blood pressure medicine that works very well for situational anxiety.  You can take it up to 3 times per day, as needed.  If you are feeling good, do not take it.  If your anxiety flares up, go ahead and take a dose.  It blocks that sympathetic nervous system (fight or flight) response that causes heart rate to increase, muscles to tense up, adrenal into flow.  By blocking that response, it helps keep you calm.    For the bumps under your armpits, I suspect it is clothing, combined with [place] summer, that is perpetuating these.  Clean the area gently with Dial soap and water twice a day.  Gets some Hibiclens from the pharmacy.  Ask the pharmacist.  Apply this to the area twice a day for about two or three days.  This will reduce the amount of bacteria on the skin.  You can also apply some a mind Bactroban (mupirocin) ointment to the area.  Rub that in overnight and it should help them go away in a couple of days.      In order to cut down on the bacterial burden, also take that Bactroban ointment and use a Q-tip to apply it to the inside of both nostrils twice a day.  The bacteria tends to live in the nose and this will help get rid of most of it.    Continue to eat a healthy diet.  Be careful with portion sizes.  Includes lots of fresh fruits, vegetables, whole grains, lean proteins.  See info below.    Keep hydrated.  Be sure to drink at least 8-10, 8 oz, glasses of water every day.    Stay active.  Try to do some sort of physical activity every day.  Nothing outrageous, just try walking for 10-15 minutes each day.

## 2025-07-25 NOTE — PROGRESS NOTES
"    [facility]       [address] [place] [address]      Phone: [phone]      Fax: [phone]    Francisco Cristina MD                Office Visit  07/25/2025        Subjective      HPI:  Chuy Garner is a 30 y.o. female presents today in clinic for "Annual Exam  ."     30-year-old female presents today for annual wellness exam.      Overall, doing well.  No chest pain, shortness on breath.  No fever, chills, body aches.  No coughing, sneezing, URI type symptoms.  Appetite normal.  Bowel movements normal.  No urinary issues.    Has issues with anxiety, depression.  Used to be on Effexor.  Took it for several years.  Was able to slowly wean herself off.  Feels like the depression symptoms are under good control.  She has only had one episode of depression in the last few months.  Biggest struggle is her anxiety.  Feels like it is getting out of control.  Thoughts tend to spiral, then turned to anger.  Causes arguments, problems in her relationship.  Sometimes has intrusive thoughts.  Only happens a couple of times each week.  Has done some counseling in the past, but not sure if it really helped.  Thought about trying OTC saffron supplements as well.    Has some bumps on her skin.  Located on bilateral flanks, just below armpits.  Red.  No drainage.  They tend to come and go, but never completely resolved.    Still working on weight loss.  In the past, she tried semaglutide.  Used it for about six months.  Lost 30 lb.  But, made her feel sick all the time.  Not interested in using it again.  Now, just tries to watch what she eats, exercises regularly.    PMH: Anxiety/depression.  Allergies.    PSH: Cyst on buttock.    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  T: Denies   A: Occasionally/socially   D:  Denies    Exercise:  Daily, each morning        The following were updated and reviewed by " Telephone Encounter by Cheyanne Oliver at 03/12/18 04:37 PM     Author:  Cheyanne Oliver Service:  (none) Author Type:       Filed:  03/12/18 04:39 PM Encounter Date:  3/12/2018 Status:  Signed     :  Cheyanne Oliver ()              CHAU OGLESBY    Patient Age: 82 year old    ACCT STATUS:   MESSAGE:[AS1.1T]   Noemi from [employer] calling & states that patient told her she needed an EKG- Noemi asking can we fax that order over?  [phone], please advise  frd to mn pool[AS1.1M]   Next and Last Visit with Provider and Department  Next visit with STEPHEN GROVER is on 04/20/2018 at  3:00 PM in [facility]  Next visit with CARDIOLOGY is on 04/20/2018 at  3:00 PM in [facility]  Last visit with STEPHEN GROVER was on 10/03/2017 at 10:30 AM in [facility]  Last visit with CARDIOLOGY was on 10/03/2017 at 10:30 AM in [facility]     WEIGHT AND HEIGHT: As of 02/27/2018 weight is 190 lbs.(86.183 kg). Height is 5' 0\"(1.524 m).   BMI is 37.11 kg/(m^2) calculated from:     Height 5' 0\" (1.524 m) as of 2/27/18     Weight 190 lb (86.183 kg) as of 2/27/18      Allergies      Allergen   Reactions   • Iodinated Diagnostic Agents  Other - See Comments     Purple dots    • Shellfish-Derived Products  Other - See Comments     Passes out    • Clindamycin  Diarrhea and Nausea and Vomiting   • Sulfa Antibiotics  Other - See Comments     Tip of tongue gets sore    • Iodides  Other - See Comments     Pt stated breaks out with purple dots.    • Lisinopril       cough     • Morphine       hallucinations    • Penicillins       gi distress      Current outpatient prescriptions       Medication  Sig Dispense Refill   • acetaminophen-codeine (TYLENOL #3) 300-30 MG per tablet Take 1 Tab by mouth every 4 (four) hours as needed for Pain. 60 Each 0   • warfarin (COUMADIN) 5 MG tablet Take 5 mg by mouth daily.     • aspirin 81 MG tablet Take 81 mg by mouth daily.      • metoprolol (LOPRESSOR) 25 MG tablet Take 25 mg by mouth 2 (two) times daily. Take 1/2 tablet twice a day     • polyvinyl alcohol (LIQUITEARS) 1.4 % ophthalmic solution Apply 1 Drop in the eye(s) as needed.     • Multiple Vitamins-Minerals (THEREMS-M) TABS TAKE ONE TABLET BY MOUTH ONCE DAILY 30 Each 11   • Meclizine HCl 25 MG TABS Take  by mouth.     • SURE COMFORT INS SYR .3CC/30G 30G X 5/16\" 0.3 ML MISC INJECT ONCE DAILY AS DIRECTED 30 Each 1   • insulin aspart (NOVOLOG) 100 UNIT/ML injection Inject 100 Units into the skin daily. Units depending per sliding scale.     • mometasone (NASONEX) 50 MCG/ACT nasal spray Use 2 Sprays in each nostril daily. 17 g 12   • glimepiride (AMARYL) 1 MG tablet TAKE ONE TABLET BY MOUTH EVERY MORNING BEFORE BREAKFAST 90 Tab 1   • lovastatin (MEVACOR) 40 MG tablet TAKE ONE TABLET BY MOUTH AT BEDTIME 30 Tab 5   • metformin (GLUCOPHAGE) 1000 MG tablet TAKE ONE TABLET BY MOUTH TWICE A DAY 60 Tab 5   • furosemide (LASIX) 20 MG tablet TAKE ONE TABLET BY MOUTH ONCE DAILY 30 Tab 5   • Isosorbide Mononitrate CR (IMDUR) 30 MG 24 hr tablet TAKE ONE TABLET BY MOUTH ONCE DAILY 30 Each 11   • fluticasone (VERAMYST) 27.5 MCG/SPRAY nasal spray 2 Sprays by Nasal route daily.     • Sennosides-Docusate Sodium (DOC-Q-LAX OR) Take  by mouth as needed.     • Famotidine (PEPCID) 20 MG tablet TAKE ONE TABLET BY MOUTH ONCE DAILY 30 Each 10   • loratadine (CLARITIN) 10 MG tablet TAKE ONE TABLET BY MOUTH ONCE DAILY 28 Each 10   • SODIUM CHLORIDE, EXTERNAL, 0.9 % SOLN Inhale 0.9 % by mouth. Indications: inhale 5 vial via nebulizer as needed     • trazodone (DESYREL) 50 MG tablet Take 50 mg by mouth nightly.     • Bisacodyl 10 MG SUPP Use  rectally.     • ondansetron (ZOFRAN) 4 MG tablet Take 4 mg by mouth every 8 (eight) hours as needed for Nausea.     • acetaminophen (MAPAP) 325 MG tablet Take 650 mg by mouth every 4 (four) hours as needed.     • Calcium Carbonate (TUMS 500 OR) Take  by mouth.       "myself in the chart: medications, past medical history, past surgical history, family history, social history, and allergies.     Medications:  Medications Ordered Prior to Encounter[1]     PMHx:  History reviewed. No pertinent past medical history.   There are no active problems to display for this patient.       PSHx:  Past Surgical History:   Procedure Laterality Date    CYST REMOVAL  2010        FHx:  No family history on file.     Social:  Social History     Socioeconomic History    Marital status: Single   Tobacco Use    Smoking status: Never     Passive exposure: Never    Smokeless tobacco: Never   Substance and Sexual Activity    Alcohol use: Yes    Drug use: Never    Sexual activity: Yes        Allergies:  Review of patient's allergies indicates:  No Known Allergies     ROS:  Review of Systems   Constitutional:  Negative for activity change, appetite change, chills and fever.   HENT:  Negative for congestion, postnasal drip, rhinorrhea, sore throat and trouble swallowing.    Respiratory:  Negative for cough, shortness of breath and wheezing.    Cardiovascular:  Negative for chest pain and palpitations.   Gastrointestinal:  Negative for abdominal pain, constipation, diarrhea, nausea and vomiting.   Genitourinary:  Negative for difficulty urinating.   Musculoskeletal:  Negative for arthralgias and myalgias.   Skin:  Positive for rash. Negative for color change.   Neurological:  Negative for speech difficulty and headaches.   Psychiatric/Behavioral:  The patient is nervous/anxious.    All other systems reviewed and are negative.         Objective      /72 (BP Location: Left arm, Patient Position: Sitting)   Pulse 70   Temp 97.6 °F (36.4 °C) (Tympanic)   Resp 16   Ht 5' 5" (1.651 m)   Wt 92 kg (202 lb 13.2 oz)   LMP 07/16/2025   SpO2 97%   BMI 33.75 kg/m²   Ht Readings from Last 3 Encounters:   07/25/25 5' 5" (1.651 m)   02/15/24 5' 5" (1.651 m)     Wt Readings from Last 3 Encounters:   07/25/25 "   PHARMACY to use:[AS1.1T] please ask[AS1.1M]          Pharmacy preference(s) on file:    [facility] [address] [facility] [place]  [facility] [email or web address] [address]    CALL BACK INFO:[AS1.1T] Ok to leave response (including medical information) with family member or on answering machine[AS1.1M]  ROUTING:[AS1.1T] Patient's physician/staff[AS1.1M]        PCP: Diego Molina MD         INS: Payor: [employer] / Plan: N/A / Product Type: *No Product type* / Note: This is the primary coverage, but no account was found for this location or the patient's primary location.   ADDRESS:  [facility] [address][AS1.1T]       Revision History        User Key Date/Time User Provider Type Action    > AS1.1 03/12/18 04:39 PM Cheyanne Oliver  Sign    M - Manual, T - Template             92 kg (202 lb 13.2 oz)   02/15/24 99.1 kg (218 lb 7.6 oz)       PHYSICAL EXAM:  Physical Exam  Vitals and nursing note reviewed.   Constitutional:       General: She is not in acute distress.     Appearance: Normal appearance.   HENT:      Head: Normocephalic and atraumatic.      Right Ear: Tympanic membrane, ear canal and external ear normal.      Left Ear: Tympanic membrane, ear canal and external ear normal.      Nose: Nose normal. No congestion or rhinorrhea.      Mouth/Throat:      Mouth: Mucous membranes are moist.      Pharynx: Oropharynx is clear. No oropharyngeal exudate or posterior oropharyngeal erythema.   Eyes:      Extraocular Movements: Extraocular movements intact.      Conjunctiva/sclera: Conjunctivae normal.      Pupils: Pupils are equal, round, and reactive to light.   Cardiovascular:      Rate and Rhythm: Normal rate and regular rhythm.   Pulmonary:      Effort: Pulmonary effort is normal. No respiratory distress.      Breath sounds: No wheezing, rhonchi or rales.   Musculoskeletal:         General: Normal range of motion.      Cervical back: Normal range of motion.   Lymphadenopathy:      Cervical: No cervical adenopathy.   Skin:     General: Skin is warm and dry.      Findings: Rash present. Rash is pustular.             Comments: Small, pustular, lesions on bilateral flanks, right more than left.  No drainage.   Neurological:      Mental Status: She is alert.              LABS / IMAGING:  No results found for this or any previous visit (from the past 26 weeks).      Assessment    1. Annual physical exam    2. Folliculitis    3. Anxiety and depression    4. Encounter for lipid screening for cardiovascular disease    5. Encounter for screening for diabetes mellitus    6. Encounter for screening for HIV    7. Encounter for HCV screening test for low risk patient          Plan    Chuy was seen today for annual exam.    Diagnoses and all orders for this visit:    Annual physical exam  -     CBC  Auto Differential; Future  -     Comprehensive Metabolic Panel; Future  -     TSH; Future  -     Lipid Panel; Future  -     Hemoglobin A1C; Future  -     HIV 1/2 Ag/Ab (4th Gen); Future  -     Hepatitis C Antibody; Future    Folliculitis  -     mupirocin (BACTROBAN) 2 % ointment; by Nasal route 2 (two) times daily. for 14 days    Anxiety and depression  -     CBC Auto Differential; Future  -     Comprehensive Metabolic Panel; Future  -     TSH; Future  -     Lipid Panel; Future  -     Hemoglobin A1C; Future  -     HIV 1/2 Ag/Ab (4th Gen); Future  -     Hepatitis C Antibody; Future  -     propranoloL (INDERAL) 20 MG tablet; Take 1 tablet (20 mg total) by mouth 3 (three) times daily as needed (Anxiety).    Encounter for lipid screening for cardiovascular disease  -     Lipid Panel; Future    Encounter for screening for diabetes mellitus  -     Hemoglobin A1C; Future    Encounter for screening for HIV  -     HIV 1/2 Ag/Ab (4th Gen); Future    Encounter for HCV screening test for low risk patient  -     Hepatitis C Antibody; Future    Skin lesions look like come out folliculitis, skin acne.  Lesions are located right along the bra line, clothing line.  Suspect that irritation from skin is causing the cysts, pustules.  We will have her try Bactroban ointment, Hibiclens.  Use Bactroban nasally for two weeks.  If no improvement, or if they worsen, then we can have her see Dermatology for other options.      Depression seems under good control.  Does have some anxious moments.  Some of it is situational.  Recommended we try using propranolol as needed.  She really does not want to use an SSRI again.    Continue to focus on diet, exercise for weight loss.    Screening labs, as above.      She needs to get established with gynecology for well-woman exam, Pap.  Referral to Dr. Hawk Meade at [facility].      FOLLOW-UP:  Follow up in about 1 year (around 7/25/2026) for annual exam.    I spent a total of 40 minutes face to face  and non-face to face on the date of this visit.This includes time preparing to see the patient (eg, review of tests, notes), obtaining and/or reviewing additional history from an independent historian and/or outside medical records, documenting clinical information in the electronic health record, independently interpreting results and/or communicating results to the patient/family/caregiver, or care coordinator.  Visit today included increased complexity associated with the care of the episodic problem addressed and managing the longitudinal care of the patient due to the serious and/or complex managed problem(s).    Signed by:  Francisco Cristina MD       [1]   Current Outpatient Medications on File Prior to Visit   Medication Sig Dispense Refill    albuterol (PROVENTIL/VENTOLIN HFA) 90 mcg/actuation inhaler Inhale 2 puffs into the lungs every 4 (four) hours as needed for Wheezing or Shortness of Breath. 8.7 g 1    cetirizine (ZYRTEC) 5 MG chewable tablet Take 5 mg by mouth once daily.       No current facility-administered medications on file prior to visit.